# Patient Record
Sex: FEMALE | Race: WHITE | URBAN - METROPOLITAN AREA
[De-identification: names, ages, dates, MRNs, and addresses within clinical notes are randomized per-mention and may not be internally consistent; named-entity substitution may affect disease eponyms.]

---

## 2017-09-28 ENCOUNTER — TELEPHONE (OUTPATIENT)
Dept: OBGYN CLINIC | Facility: CLINIC | Age: 57
End: 2017-09-28

## 2017-11-21 ENCOUNTER — OFFICE VISIT (OUTPATIENT)
Dept: OBGYN CLINIC | Facility: CLINIC | Age: 57
End: 2017-11-21

## 2017-11-21 VITALS
WEIGHT: 122 LBS | DIASTOLIC BLOOD PRESSURE: 64 MMHG | HEIGHT: 66 IN | HEART RATE: 77 BPM | SYSTOLIC BLOOD PRESSURE: 118 MMHG | BODY MASS INDEX: 19.61 KG/M2

## 2017-11-21 DIAGNOSIS — N91.2: ICD-10-CM

## 2017-11-21 DIAGNOSIS — Z12.4 PAPANICOLAOU SMEAR FOR CERVICAL CANCER SCREENING: ICD-10-CM

## 2017-11-21 DIAGNOSIS — Z01.419 ENCOUNTER FOR GYNECOLOGICAL EXAMINATION WITHOUT ABNORMAL FINDING: Primary | ICD-10-CM

## 2017-11-21 DIAGNOSIS — Z12.31 SCREENING MAMMOGRAM, ENCOUNTER FOR: ICD-10-CM

## 2017-11-21 PROCEDURE — 99396 PREV VISIT EST AGE 40-64: CPT | Performed by: OBSTETRICS & GYNECOLOGY

## 2017-11-21 PROCEDURE — 88175 CYTOPATH C/V AUTO FLUID REDO: CPT | Performed by: OBSTETRICS & GYNECOLOGY

## 2017-11-21 PROCEDURE — 87624 HPV HI-RISK TYP POOLED RSLT: CPT | Performed by: OBSTETRICS & GYNECOLOGY

## 2017-11-21 RX ORDER — MEDROXYPROGESTERONE ACETATE 10 MG/1
10 TABLET ORAL DAILY
Qty: 7 TABLET | Refills: 0 | Status: SHIPPED | OUTPATIENT
Start: 2017-11-21 | End: 2017-12-05

## 2017-11-21 NOTE — PROGRESS NOTES
Patient ID:   Cholo Reynolds  is a 56year old female [id number]        HPI:     Here for general gyn exam. Last seen September 2016 by Dr Dragan Aquino. New medical/surgical hx:  None. Patient's last menstrual period was 06/01/2017 (within days). Menses:   Hx P POSSIBLE POLYPECTOMY 73666;  Surgeon: Sharri Betancourt MD;  Location: [address] [place] [address]  2009: JEANNETTE BIOPSY STEREOTACTIC [facility] 1 SITE RIGHT      Comment: benign  2012: NEEDLE BIOPSY LEFT      Comment: BENIGN  2012: NEED cancer screening      Orders Placed This Encounter      ThinPrep PAP with HPV Reflex Request    Meds & Refills for this Visit:  Signed Prescriptions Disp Refills    MedroxyPROGESTERone Acetate 10 MG Oral Tab 7 tablet 0      Sig: Take 1 tablet (10 mg total)

## 2017-11-22 ENCOUNTER — TELEPHONE (OUTPATIENT)
Dept: OBGYN CLINIC | Facility: CLINIC | Age: 57
End: 2017-11-22

## 2017-11-22 NOTE — TELEPHONE ENCOUNTER
Patient had questions regarding Provera. All questions answered. Will call if decides to not take it. Verbalized understanding. No further questions or concerns.

## 2017-11-25 ENCOUNTER — HOSPITAL ENCOUNTER (OUTPATIENT)
Dept: MAMMOGRAPHY | Age: 57
Discharge: HOME | End: 2017-11-25
Attending: OBSTETRICS & GYNECOLOGY
Payer: COMMERCIAL

## 2017-11-25 DIAGNOSIS — Z12.31 SCREENING MAMMOGRAM, ENCOUNTER FOR: ICD-10-CM

## 2017-11-25 PROCEDURE — 77067 SCR MAMMO BI INCL CAD: CPT | Performed by: OBSTETRICS & GYNECOLOGY

## 2017-12-05 ENCOUNTER — TELEPHONE (OUTPATIENT)
Dept: OBGYN CLINIC | Facility: CLINIC | Age: 57
End: 2017-12-05

## 2017-12-05 RX ORDER — MEDROXYPROGESTERONE ACETATE 10 MG/1
10 TABLET ORAL DAILY
Qty: 7 TABLET | Refills: 0 | Status: SHIPPED | OUTPATIENT
Start: 2017-12-05 | End: 2019-01-15 | Stop reason: ALTCHOICE

## 2017-12-05 NOTE — TELEPHONE ENCOUNTER
Patient stated that [place] did not receive Provera RX. Resent to pharmacy. Patient advised to call with any further questions or concerns.

## 2017-12-05 NOTE — TELEPHONE ENCOUNTER
Patient called, when she saw Dr. Millie Yoon in November, he was supposed to call in medication for her and her pharmacist told her that there was nothing sent from us. Pt wants to be notified.

## 2018-12-04 ENCOUNTER — TELEPHONE (OUTPATIENT)
Dept: OBGYN CLINIC | Facility: CLINIC | Age: 58
End: 2018-12-04

## 2018-12-04 DIAGNOSIS — Z12.39 BREAST CANCER SCREENING: Primary | ICD-10-CM

## 2018-12-04 NOTE — TELEPHONE ENCOUNTER
Patient's last annual was 11/2017. Per protocol she is able to have mammogram ordered before appt. Order entered. No further questions or concerns.

## 2018-12-04 NOTE — TELEPHONE ENCOUNTER
Patient made appointment for annual last week and wants an order for a mammogram so she can have this done prior to appointment.

## 2018-12-07 ENCOUNTER — HOSPITAL ENCOUNTER (OUTPATIENT)
Dept: MAMMOGRAPHY | Age: 58
Discharge: HOME | End: 2018-12-07
Attending: OBSTETRICS & GYNECOLOGY
Payer: COMMERCIAL

## 2018-12-07 DIAGNOSIS — Z12.39 BREAST CANCER SCREENING: ICD-10-CM

## 2018-12-07 PROCEDURE — 77067 SCR MAMMO BI INCL CAD: CPT | Performed by: OBSTETRICS & GYNECOLOGY

## 2018-12-07 PROCEDURE — 77063 BREAST TOMOSYNTHESIS BI: CPT | Performed by: OBSTETRICS & GYNECOLOGY

## 2019-01-15 ENCOUNTER — OFFICE VISIT (OUTPATIENT)
Dept: OBGYN CLINIC | Facility: CLINIC | Age: 59
End: 2019-01-15
Payer: COMMERCIAL

## 2019-01-15 VITALS
SYSTOLIC BLOOD PRESSURE: 118 MMHG | HEIGHT: 65.75 IN | BODY MASS INDEX: 19.52 KG/M2 | WEIGHT: 120 LBS | HEART RATE: 73 BPM | DIASTOLIC BLOOD PRESSURE: 60 MMHG

## 2019-01-15 DIAGNOSIS — Z01.419 ENCOUNTER FOR GYNECOLOGICAL EXAMINATION WITHOUT ABNORMAL FINDING: Primary | ICD-10-CM

## 2019-01-15 PROCEDURE — 99396 PREV VISIT EST AGE 40-64: CPT | Performed by: OBSTETRICS & GYNECOLOGY

## 2019-01-15 NOTE — PROGRESS NOTES
Patient ID:   Latesha Esparza  is a 66year old female [id number]        HPI:     Here for gyn exam. Last seen November 2017. New medical/surgical hx:  Some sporadic palpations - no cause found and decreased in frequency.       Patient's last menstrual period Ht 65.75\"   Wt 120 lb   LMP 01/07/2018 (Exact Date)   BMI 19.52 kg/m²   Neck:  Supple. Thyroid:  normal.  No cervical adenopathy. Cardiovascular: Normal rate, rhythm, heart sounds. Pulmonary/Chest: Clear to auscultation.   Breasts:   Symmetrical.  Firm

## 2019-11-04 ENCOUNTER — TELEPHONE (OUTPATIENT)
Dept: OBGYN CLINIC | Facility: CLINIC | Age: 59
End: 2019-11-04

## 2019-11-04 DIAGNOSIS — Z12.31 BREAST CANCER SCREENING BY MAMMOGRAM: Primary | ICD-10-CM

## 2019-11-04 NOTE — TELEPHONE ENCOUNTER
Last OV: 1/15/19 with Dr. Ravi Mckeon for annual  Last mammogram: 12/7/18 screening mammo birads 1- negative  Next appt: 1/15/20    screening mammogram ordered per protocol. Patient notified via phone.

## 2019-11-04 NOTE — TELEPHONE ENCOUNTER
Patient would like to get an order for a mammogram.  She will be seeing Dr. Kingsley Bustamante on 1/16/20.

## 2019-12-11 ENCOUNTER — HOSPITAL ENCOUNTER (OUTPATIENT)
Dept: MAMMOGRAPHY | Age: 59
Discharge: HOME | End: 2019-12-11
Attending: OBSTETRICS & GYNECOLOGY
Payer: COMMERCIAL

## 2019-12-11 DIAGNOSIS — Z12.31 BREAST CANCER SCREENING BY MAMMOGRAM: ICD-10-CM

## 2019-12-11 PROCEDURE — 77067 SCR MAMMO BI INCL CAD: CPT | Performed by: OBSTETRICS & GYNECOLOGY

## 2020-01-29 ENCOUNTER — OFFICE VISIT (OUTPATIENT)
Dept: OBGYN CLINIC | Facility: CLINIC | Age: 60
End: 2020-01-29
Payer: COMMERCIAL

## 2020-01-29 VITALS
HEIGHT: 66 IN | DIASTOLIC BLOOD PRESSURE: 64 MMHG | BODY MASS INDEX: 19.29 KG/M2 | HEART RATE: 71 BPM | WEIGHT: 120 LBS | SYSTOLIC BLOOD PRESSURE: 108 MMHG

## 2020-01-29 DIAGNOSIS — Z12.4 CERVICAL CANCER SCREENING: ICD-10-CM

## 2020-01-29 DIAGNOSIS — Z01.419 WELL FEMALE EXAM WITH ROUTINE GYNECOLOGICAL EXAM: Primary | ICD-10-CM

## 2020-01-29 DIAGNOSIS — Z14.8 GENETIC CARRIER STATUS: ICD-10-CM

## 2020-01-29 PROCEDURE — 88175 CYTOPATH C/V AUTO FLUID REDO: CPT | Performed by: OBSTETRICS & GYNECOLOGY

## 2020-01-29 PROCEDURE — 99396 PREV VISIT EST AGE 40-64: CPT | Performed by: OBSTETRICS & GYNECOLOGY

## 2020-01-29 PROCEDURE — 87624 HPV HI-RISK TYP POOLED RSLT: CPT | Performed by: OBSTETRICS & GYNECOLOGY

## 2020-01-29 NOTE — PROGRESS NOTES
GYN H&P     2020  10:58 AM    CC: Patient is here for annual    HPI: patient is a 64year old  here for her annual gyn exam.   She has no complaints. menopausal, does note some menop s/s with hot flashes and vaginal dryness.  She is not sure she Nevus - unusual features   • US BREAST BIOPSY 2 SITE BILAT (CPT=19083/93453)  2012    both benign     No Known Allergies  Multiple Vitamins-Minerals (MULTIVITAMIN OR), Take  by mouth., Disp: , Rfl:     No current facility-administered medications on file p No        Caffeine Concern: Yes          1 cup coffee per day        Occupational Exposure: No        Hobby Hazards: No        Sleep Concern: No        Stress Concern: No        Weight Concern: No        Special Diet: No        Back Care: No        Exercis axillary adenopathy  ABDOMEN: Soft, non distended; non tender, no masses  GYNE/: External Genitalia: Normal appearing, no lesions. Urethral meatus appear wnl, no abnormal discharge or lesions noted.            Bladder: well supported, urethra wnl, no lesi

## 2020-01-30 LAB — HPV I/H RISK 1 DNA SPEC QL NAA+PROBE: NEGATIVE

## 2020-03-10 ENCOUNTER — GENETICS ENCOUNTER (OUTPATIENT)
Dept: GENETICS | Facility: HOSPITAL | Age: 60
End: 2020-03-10
Attending: GENETIC COUNSELOR, MS
Payer: COMMERCIAL

## 2020-03-10 PROCEDURE — 96040 HC GENETIC COUNSELING EA 30 MIN: CPT | Performed by: GENETIC COUNSELOR, MS

## 2020-03-11 NOTE — PROGRESS NOTES
Referring Provider: Craig Ba MD    Additional Provider: Vijay Ruth MD    Reason for Referral:  Vale Curry was referred for genetic counseling because of a family history of a CHEK2 pathogenic variant.   Ms. Claudio Jackson is a 64year-old woman of [facility] Ms. Roz Benoit provided me with a copy of her sister’s genetic test report. Please see the pedigree for additional family history information. Counseling: The following information was discussed with Ms. Goetz. Genetics of Cancer:   The majority of can breast and colorectal cancer. Current NCCN Guidelines (v1.2020) do not include breast cancer screening recommendations for women with missense CHEK2 pathogenic variants.   They note, “the risk for most missense pathogenic variants are unclear but for s If Ms. Randall Gutierrez tests positive for the familial CHEK2 variant, she is at moderately increased risk for breast and colorectal cancer. This knowledge would influence her medical recommendations and choices regarding surveillance, screening, and prevention.

## 2020-03-24 ENCOUNTER — GENETICS ENCOUNTER (OUTPATIENT)
Dept: HEMATOLOGY/ONCOLOGY | Facility: HOSPITAL | Age: 60
End: 2020-03-24

## 2020-03-24 NOTE — PROGRESS NOTES
Referring Provider:       Angelica Newton MD     Additional Provider:     Frankie Jacinto MD    Reason for Referral:  Radha Banks had blood drawn on 3/10/2020 for genetic testing because of a family history of a CHEK2 variant in her mother.        Genetic Lisa extremely dense breast tissue. She should follow-up with Dr. Zack Tang or Dr. Yun Mora to discuss the pros and cons of bilateral screening breast MRI given her CHEK2 status and dense breast tissue.   I also encouraged her to call me annually about updates reg

## 2020-06-18 ENCOUNTER — TELEPHONE (OUTPATIENT)
Dept: GENETICS | Facility: HOSPITAL | Age: 60
End: 2020-06-18

## 2020-06-18 NOTE — TELEPHONE ENCOUNTER
Elverconcha Cole says her children are able to test for free, and she wanted to schedule them before the 90 days. She says she would like to speak with Nenita about this.  Please call

## 2020-09-28 ENCOUNTER — TELEPHONE (OUTPATIENT)
Dept: OBGYN CLINIC | Facility: CLINIC | Age: 60
End: 2020-09-28

## 2020-09-28 NOTE — TELEPHONE ENCOUNTER
Pt states she is in menopause and lately has been experiencing bloating, discharge and breast tenderness. She wanted to speak with a nurse to determine if this was normal or should she be checked out.

## 2020-09-28 NOTE — TELEPHONE ENCOUNTER
65 y/o called c/o worsening of menopausal symptoms. She has been post menopausal for 2 years. She reports new onset of breast tenderness, bloating, and clear discharge the past 2 weeks. Denies any vaginal bleeding.    Last OV date: 01/29/2020  Recent Test/L

## 2020-10-06 ENCOUNTER — OFFICE VISIT (OUTPATIENT)
Dept: OBGYN CLINIC | Facility: CLINIC | Age: 60
End: 2020-10-06
Payer: COMMERCIAL

## 2020-10-06 VITALS
BODY MASS INDEX: 19.13 KG/M2 | WEIGHT: 119 LBS | HEART RATE: 72 BPM | SYSTOLIC BLOOD PRESSURE: 104 MMHG | HEIGHT: 66 IN | DIASTOLIC BLOOD PRESSURE: 62 MMHG

## 2020-10-06 DIAGNOSIS — N95.1 MENOPAUSAL SYMPTOMS: Primary | ICD-10-CM

## 2020-10-06 PROCEDURE — 3008F BODY MASS INDEX DOCD: CPT | Performed by: OBSTETRICS & GYNECOLOGY

## 2020-10-06 PROCEDURE — 99213 OFFICE O/P EST LOW 20 MIN: CPT | Performed by: OBSTETRICS & GYNECOLOGY

## 2020-10-06 PROCEDURE — 3078F DIAST BP <80 MM HG: CPT | Performed by: OBSTETRICS & GYNECOLOGY

## 2020-10-06 PROCEDURE — 3074F SYST BP LT 130 MM HG: CPT | Performed by: OBSTETRICS & GYNECOLOGY

## 2020-10-06 NOTE — PROGRESS NOTES
Patient with history of menopause over last two years  recent  'bloatiness' last three weeks  Breast tenderness  No bleeding, vaginal dryness improved  Some abdominal pain like 'ovulation'    Has 21 months old grand child    ROS: No Cardiac, Respiratory, G

## 2020-12-07 ENCOUNTER — TELEPHONE (OUTPATIENT)
Dept: OBGYN CLINIC | Facility: CLINIC | Age: 60
End: 2020-12-07

## 2020-12-07 DIAGNOSIS — Z15.01 GENETIC SUSCEPTIBILITY TO MALIGNANT NEOPLASM OF BREAST: Primary | ICD-10-CM

## 2020-12-07 NOTE — TELEPHONE ENCOUNTER
Patient saw Gila Swartz March 2020 and was positive for chek2 variant. This puts patient at greater risk for breast and colorectal cancer. She was advised on screening but could not remember if MRI and mammogram needed to be done together or alternating.

## 2020-12-09 NOTE — TELEPHONE ENCOUNTER
Have not received call back from nurse navigator as of now. Spoke with Dr. Opal Hobbs. She stated patient should have Mammogram ordered at this time and then follow up with oncology with [facility] for further recommendations on alternating mammogram and MRI.

## 2020-12-22 ENCOUNTER — HOSPITAL ENCOUNTER (OUTPATIENT)
Dept: MAMMOGRAPHY | Age: 60
Discharge: HOME | End: 2020-12-22
Attending: OBSTETRICS & GYNECOLOGY
Payer: COMMERCIAL

## 2020-12-22 DIAGNOSIS — Z15.01 GENETIC SUSCEPTIBILITY TO MALIGNANT NEOPLASM OF BREAST: ICD-10-CM

## 2020-12-22 PROCEDURE — 77067 SCR MAMMO BI INCL CAD: CPT | Performed by: OBSTETRICS & GYNECOLOGY

## 2020-12-22 PROCEDURE — 77063 BREAST TOMOSYNTHESIS BI: CPT | Performed by: OBSTETRICS & GYNECOLOGY

## 2021-02-26 PROBLEM — Z00.00 HEALTHCARE MAINTENANCE: Status: ACTIVE | Noted: 2021-02-26

## 2021-02-26 PROBLEM — Z15.89 BIALLELIC MUTATION OF CHEK2 GENE: Status: ACTIVE | Noted: 2021-02-26

## 2021-02-26 PROBLEM — Z15.01 BIALLELIC MUTATION OF CHEK2 GENE: Status: ACTIVE | Noted: 2021-02-26

## 2021-02-26 PROBLEM — Z15.09 BIALLELIC MUTATION OF CHEK2 GENE: Status: ACTIVE | Noted: 2021-02-26

## 2021-02-26 PROBLEM — E78.00 PURE HYPERCHOLESTEROLEMIA: Status: ACTIVE | Noted: 2021-02-26

## 2021-02-26 PROBLEM — Z80.0 FAMILY HISTORY OF COLON CANCER IN MOTHER: Status: ACTIVE | Noted: 2021-02-26

## 2021-02-26 PROBLEM — R91.8 LUNG NODULES: Status: ACTIVE | Noted: 2021-02-26

## 2021-02-26 PROBLEM — R06.83 SNORING: Status: ACTIVE | Noted: 2021-02-26

## 2021-02-26 PROBLEM — Z80.3 FAMILY HISTORY OF BREAST CANCER IN MOTHER: Status: ACTIVE | Noted: 2021-02-26

## 2021-03-19 ENCOUNTER — OFFICE VISIT (OUTPATIENT)
Dept: OBGYN CLINIC | Facility: CLINIC | Age: 61
End: 2021-03-19
Payer: COMMERCIAL

## 2021-03-19 VITALS
WEIGHT: 119.63 LBS | DIASTOLIC BLOOD PRESSURE: 70 MMHG | HEART RATE: 77 BPM | HEIGHT: 66 IN | SYSTOLIC BLOOD PRESSURE: 108 MMHG | BODY MASS INDEX: 19.22 KG/M2

## 2021-03-19 DIAGNOSIS — N95.1 MENOPAUSAL SYMPTOMS: ICD-10-CM

## 2021-03-19 DIAGNOSIS — Z01.419 WELL FEMALE EXAM WITH ROUTINE GYNECOLOGICAL EXAM: Primary | ICD-10-CM

## 2021-03-19 PROCEDURE — 99396 PREV VISIT EST AGE 40-64: CPT | Performed by: OBSTETRICS & GYNECOLOGY

## 2021-03-19 PROCEDURE — 3078F DIAST BP <80 MM HG: CPT | Performed by: OBSTETRICS & GYNECOLOGY

## 2021-03-19 PROCEDURE — 3008F BODY MASS INDEX DOCD: CPT | Performed by: OBSTETRICS & GYNECOLOGY

## 2021-03-19 PROCEDURE — 3074F SYST BP LT 130 MM HG: CPT | Performed by: OBSTETRICS & GYNECOLOGY

## 2021-03-19 NOTE — PROGRESS NOTES
Annual  No C/O  Menopausal state, palpitations (saw pulmonologist)  Vaginal dryness, Revaree discussed again  Falls asleep, then wakes up  Going for colonoscopy, mammogram (CHEK2 carrier)    ROS: No Cardiac, Respiratory, GI,  or Neurological symptoms.

## 2021-04-14 PROBLEM — H93.299 ABNORMAL AUDITORY PERCEPTION, UNSPECIFIED LATERALITY: Status: ACTIVE | Noted: 2021-04-14

## 2021-04-14 PROBLEM — H93.12 LEFT-SIDED TINNITUS: Status: ACTIVE | Noted: 2021-04-14

## 2021-04-14 PROBLEM — G47.33 OSA (OBSTRUCTIVE SLEEP APNEA): Status: ACTIVE | Noted: 2021-04-14

## 2021-05-26 PROBLEM — Z91.89 AT HIGH RISK FOR BREAST CANCER: Status: ACTIVE | Noted: 2021-05-26

## 2023-02-07 ENCOUNTER — OFFICE VISIT (OUTPATIENT)
Dept: OBGYN CLINIC | Facility: CLINIC | Age: 63
End: 2023-02-07
Payer: COMMERCIAL

## 2023-02-07 VITALS
HEIGHT: 66 IN | BODY MASS INDEX: 19.35 KG/M2 | WEIGHT: 120.38 LBS | HEART RATE: 86 BPM | DIASTOLIC BLOOD PRESSURE: 54 MMHG | SYSTOLIC BLOOD PRESSURE: 100 MMHG

## 2023-02-07 DIAGNOSIS — Z15.01 BIALLELIC MUTATION OF CHEK2 GENE WITHOUT DIAGNOSED MALIGNANCY: ICD-10-CM

## 2023-02-07 DIAGNOSIS — Z01.419 WELL WOMAN EXAM WITH ROUTINE GYNECOLOGICAL EXAM: Primary | ICD-10-CM

## 2023-02-07 DIAGNOSIS — Z15.09 BIALLELIC MUTATION OF CHEK2 GENE WITHOUT DIAGNOSED MALIGNANCY: ICD-10-CM

## 2023-02-07 DIAGNOSIS — Z12.4 SCREENING FOR CERVICAL CANCER: ICD-10-CM

## 2023-02-07 DIAGNOSIS — Z12.31 ENCOUNTER FOR SCREENING MAMMOGRAM FOR MALIGNANT NEOPLASM OF BREAST: ICD-10-CM

## 2023-02-07 DIAGNOSIS — Z11.51 SCREENING FOR HUMAN PAPILLOMAVIRUS (HPV): ICD-10-CM

## 2023-02-07 PROCEDURE — 3074F SYST BP LT 130 MM HG: CPT | Performed by: NURSE PRACTITIONER

## 2023-02-07 PROCEDURE — 87624 HPV HI-RISK TYP POOLED RSLT: CPT | Performed by: NURSE PRACTITIONER

## 2023-02-07 PROCEDURE — 3008F BODY MASS INDEX DOCD: CPT | Performed by: NURSE PRACTITIONER

## 2023-02-07 PROCEDURE — 99386 PREV VISIT NEW AGE 40-64: CPT | Performed by: NURSE PRACTITIONER

## 2023-02-07 PROCEDURE — 3078F DIAST BP <80 MM HG: CPT | Performed by: NURSE PRACTITIONER

## 2023-02-08 LAB — HPV I/H RISK 1 DNA SPEC QL NAA+PROBE: NEGATIVE

## 2023-02-10 ENCOUNTER — HOSPITAL ENCOUNTER (OUTPATIENT)
Dept: MAMMOGRAPHY | Age: 63
Discharge: HOME | End: 2023-02-10
Attending: NURSE PRACTITIONER
Payer: COMMERCIAL

## 2023-02-10 DIAGNOSIS — Z12.31 ENCOUNTER FOR SCREENING MAMMOGRAM FOR MALIGNANT NEOPLASM OF BREAST: ICD-10-CM

## 2023-02-10 PROCEDURE — 77063 BREAST TOMOSYNTHESIS BI: CPT | Performed by: NURSE PRACTITIONER

## 2023-02-10 PROCEDURE — 77067 SCR MAMMO BI INCL CAD: CPT | Performed by: NURSE PRACTITIONER

## 2023-02-21 LAB
LAST PAP RESULT: NORMAL
PAP HISTORY (OTHER THAN LAST PAP): NORMAL

## 2023-09-22 ENCOUNTER — TELEPHONE (OUTPATIENT)
Dept: OBGYN CLINIC | Facility: CLINIC | Age: 63
End: 2023-09-22

## 2023-09-22 NOTE — TELEPHONE ENCOUNTER
Pt called stating she needs PA for her breast MRI. Her appt is in about 1 1/2 weeks.      Future Appointments   Date Time Provider Karis Paez   10/2/2023  6:00 PM [address] MR RM2 (1.5T WIDE) [address] MRI [facility]     Pt would like to know when this is approved as she had a very hard time in 2021 for this to be covered

## 2023-09-22 NOTE — TELEPHONE ENCOUNTER
Patient notified that the hospital completes the prior authorization process for her breast MRI. Patient states she has already contacted her insurance and Central Scheduling at [facility] and was not able to obtain confirmation that her imaging has been authorized. Patient states that she went through a year long appeal process with her last breast MRI bill and she does not want to do that again. Informed patient that I called the Radiology/Mammography department and Central Scheduling and was not able to verify if authorization has been obtained. Email sent to the insurance verification team at [facility] to check on status of authorization. Will wait for response. Encouraged patient to contact [facility] herself and ask to be transferred to someone who can help with an imaging authorization. Patient verbalized understanding.

## 2023-09-28 NOTE — TELEPHONE ENCOUNTER
Per referral notes:  Note:  Called: Wally Singer [phone]  Spoke with:   Status: PENDING  Call reference #  ON HOLD WITH HEALTH PLAN, UNABLE TO REACH LIVE REP. CASE PENDING WITH HEALTH PLAN.

## 2023-10-02 ENCOUNTER — HOSPITAL ENCOUNTER (OUTPATIENT)
Dept: MRI IMAGING | Facility: HOSPITAL | Age: 63
Discharge: HOME | End: 2023-10-02
Attending: NURSE PRACTITIONER
Payer: COMMERCIAL

## 2023-10-02 DIAGNOSIS — Z15.01 BIALLELIC MUTATION OF CHEK2 GENE WITHOUT DIAGNOSED MALIGNANCY: ICD-10-CM

## 2023-10-02 DIAGNOSIS — Z15.09 BIALLELIC MUTATION OF CHEK2 GENE WITHOUT DIAGNOSED MALIGNANCY: ICD-10-CM

## 2023-10-02 PROCEDURE — 77049 MRI BREAST C-+ W/CAD BI: CPT | Performed by: NURSE PRACTITIONER

## 2023-10-02 PROCEDURE — A9575 INJ GADOTERATE MEGLUMI 0.1ML: HCPCS

## 2023-10-02 RX ORDER — GADOTERATE MEGLUMINE 376.9 MG/ML
15 INJECTION INTRAVENOUS
Status: COMPLETED | OUTPATIENT
Start: 2023-10-02 | End: 2023-10-02

## 2023-10-02 RX ADMIN — GADOTERATE MEGLUMINE 11 ML: 376.9 INJECTION INTRAVENOUS at 20:15:00

## 2024-03-29 ENCOUNTER — OFFICE VISIT (OUTPATIENT)
Dept: OBGYN CLINIC | Facility: CLINIC | Age: 64
End: 2024-03-29
Payer: COMMERCIAL

## 2024-03-29 VITALS
WEIGHT: 121.13 LBS | BODY MASS INDEX: 20 KG/M2 | SYSTOLIC BLOOD PRESSURE: 102 MMHG | HEART RATE: 79 BPM | DIASTOLIC BLOOD PRESSURE: 60 MMHG

## 2024-03-29 DIAGNOSIS — Z15.89 BIALLELIC MUTATION OF CHEK2 GENE: ICD-10-CM

## 2024-03-29 DIAGNOSIS — Z80.3 FAMILY HISTORY OF BREAST CANCER IN MOTHER: ICD-10-CM

## 2024-03-29 DIAGNOSIS — Z15.01 BIALLELIC MUTATION OF CHEK2 GENE: ICD-10-CM

## 2024-03-29 DIAGNOSIS — Z12.31 ENCOUNTER FOR SCREENING MAMMOGRAM FOR MALIGNANT NEOPLASM OF BREAST: ICD-10-CM

## 2024-03-29 DIAGNOSIS — Z15.09 BIALLELIC MUTATION OF CHEK2 GENE: ICD-10-CM

## 2024-03-29 DIAGNOSIS — Z01.419 WELL WOMAN EXAM WITH ROUTINE GYNECOLOGICAL EXAM: Primary | ICD-10-CM

## 2024-03-29 DIAGNOSIS — Z91.89 AT HIGH RISK FOR BREAST CANCER: ICD-10-CM

## 2024-03-29 PROCEDURE — 99396 PREV VISIT EST AGE 40-64: CPT | Performed by: NURSE PRACTITIONER

## 2024-03-29 NOTE — PROGRESS NOTES
Subjective:  Chief Complaint   Patient presents with    Physical     Annual exam      63 year old female  presents for annual.    Pt notes she occasionally gets headaches after intercourse  Not everytime  Not sure of aggravating factors  Pain self resolves    Patient's last menstrual period was 2018 (exact date).  Hx Prior Abnormal Pap: No  Pap Date: 23  Pap Result Notes: WNL  Menarche: 15 (3/29/2024  7:50 AM)  Period Cycle (Days): Postmeno (3/29/2024  7:50 AM)  Period Duration (Days): N/A (3/29/2024  7:50 AM)  Period Flow: N/A (3/29/2024  7:50 AM)  Use of Birth Control (if yes, specify type): Postmenopausal (3/29/2024  7:50 AM)  Hx Prior Abnormal Pap: No (3/29/2024  7:50 AM)  Pap Date: 23 (3/29/2024  7:50 AM)  Pap Result Notes: WNL (3/29/2024  7:50 AM)    Last Mammo:  2023    Pt is chek2 positive  Was doing MRI and mammogram staggered by 6months    Feeling safe at home.    Most Recent Immunizations   Administered Date(s) Administered    Covid-19 Vaccine Moderna 100 mcg/0.5 ml 01/15/2022    Depo-Medrol 40mg Inj 2016      reports that she has never smoked. She has never used smokeless tobacco.   reports current alcohol use.    Past Medical History:   Diagnosis Date    Abnormal uterine bleeding     Cancer (HCC) 6/15/04    Melanoma in situ on back    H/O mammogram 10/03/2016    benign    Pap smear for cervical cancer screening , , , 12/10, ,     WNL    Scapular dyskinesis 2016    Usual ductal hyperplasia of right breast 2009    on R Br Bx - in media     Past Surgical History:   Procedure Laterality Date    ANESTH,KNEE VEINS SURGERY      BREAST BIOPSY Bilateral 10/11/2012    benign    BREAST BIOPSY Right 2009    Usual Ductal Hyperplasia    COLONOSCOPY  12  ASC (Pan)    Screening: int hemorrhoids; next colonoscopy in 10 yrs    COLONOSCOPY,DIAGNOSTIC  2012    Procedure: COLONOSCOPY, POSSIBLE BIOPSY, POSSIBLE POLYPECTOMY 85355;   Surgeon: Amador Pan MD;  Location: [facility] [place]    NEEDLE BIOPSY LEFT  2012     apocrine cyst     NEEDLE BIOPSY RIGHT  2012    -Proliferative fibrocystic changes including somewhat circumscribed     SKIN SURGERY  1/13/14    Bashir's Nevus - unusual features       Review of Systems:  Pertinent items are noted in the HPI.    Objective:  /60   Pulse 79   Wt 121 lb 1.6 oz (54.9 kg)   LMP 01/07/2018 (Exact Date)   BMI 19.55 kg/m²    Physical Examination:  General appearance: Well dressed, well nourished in no apparent distress  Neurologic/Psychiatric: Alert and oriented to person, place and time, mood normal, affect appropriate  Head: Normocephalic without obvious deformity, atraumatic  Neck: No thyromegaly, supple, non-tender, no masses, no adenopathy  Lungs: Clear to auscultation bilaterally, no rales, wheezes or rhonchi  Breasts: Symmetric, non-tender, no masses, lesions, retraction, dimpling or discharge bilaterally, no axillary or supraclavicular lymphadenopathy  Heart: Regular rate and rhythm, no gallops or murmurs  Abdomen: Soft, non-tender, non-distended, no masses, no hepatosplenomegaly, no hernias, no inguinal lymphadenopathy  Pelvic:    External genitalia- Normal, Bartholin's, urethra, skeins glands normal   Vagina- No vaginal lesions, physiologic discharge   Urethra- Non-tender, no masses   Urethral Meatus- No lesions or masses, no prolapse   Bladder- Non-tender, no masses   Cervix- No lesions, long/closed, no cervical motion tenderness   Uterus- Normal sized, non-tender, no masses   Adnexa-  Non-tender, no masses   Anus/Perineum- Normal, no masses or lesions  Extremities: Non-tender, full range of motion, no clubbing, cyanosis or edema  Skin:  General inspection- no rashes, lesions or discoloration    Assessment/Plan:  Normal well-woman exam.  Yearly mammogram ordered  Pap smear UTD      Diagnoses and all orders for this visit:    Well woman exam with routine gynecological exam  -  cervical CA screen last 2/2023, ASCCP pap guidelines discussed, pap deferred    Encounter for screening mammogram for malignant neoplasm of breast  -     JEANNETTE SUZAN 2D+3D SCREENING BILAT (CPT=77067/34970); Future    Family history of breast cancer in mother  -     MRI BREAST SCREENING (W+WO) W/CAD BILAT (CPT=77049); Future  -     JEANNETTE SUZAN 2D+3D SCREENING BILAT (CPT=77067/72128); Future    Biallelic mutation of CHEK2 gene  -     MRI BREAST SCREENING (W+WO) W/CAD BILAT (CPT=77049); Future  -     JEANNETTE SUZAN 2D+3D SCREENING BILAT (CPT=77067/93426); Future  - colonoscopy per GI    At high risk for breast cancer  -     MRI BREAST SCREENING (W+WO) W/CAD BILAT (CPT=77049); Future  -     JEANNETTE SUZAN 2D+3D SCREENING BILAT (CPT=77067/63901); Future  - monthly SBE  - biannual CBE         Return in about 1 year (around 3/29/2025) for annual well woman exam or sooner if needed.

## 2024-04-17 ENCOUNTER — HOSPITAL ENCOUNTER (OUTPATIENT)
Dept: MAMMOGRAPHY | Age: 64
Discharge: HOME | End: 2024-04-17
Attending: NURSE PRACTITIONER
Payer: COMMERCIAL

## 2024-04-17 DIAGNOSIS — Z80.3 FAMILY HISTORY OF BREAST CANCER IN MOTHER: ICD-10-CM

## 2024-04-17 DIAGNOSIS — Z12.31 ENCOUNTER FOR SCREENING MAMMOGRAM FOR MALIGNANT NEOPLASM OF BREAST: ICD-10-CM

## 2024-04-17 DIAGNOSIS — Z15.01 BIALLELIC MUTATION OF CHEK2 GENE: ICD-10-CM

## 2024-04-17 DIAGNOSIS — Z15.09 BIALLELIC MUTATION OF CHEK2 GENE: ICD-10-CM

## 2024-04-17 DIAGNOSIS — Z15.89 BIALLELIC MUTATION OF CHEK2 GENE: ICD-10-CM

## 2024-04-17 DIAGNOSIS — Z91.89 AT HIGH RISK FOR BREAST CANCER: ICD-10-CM

## 2024-04-17 PROCEDURE — 77067 SCR MAMMO BI INCL CAD: CPT | Performed by: NURSE PRACTITIONER

## 2024-04-17 PROCEDURE — 77063 BREAST TOMOSYNTHESIS BI: CPT | Performed by: NURSE PRACTITIONER

## 2025-03-06 ENCOUNTER — PATIENT MESSAGE (OUTPATIENT)
Dept: OBGYN CLINIC | Facility: CLINIC | Age: 65
End: 2025-03-06

## 2025-03-07 NOTE — TELEPHONE ENCOUNTER
Patient due for mammogram in April. Did not obtain 6mo MRI as recommended. Asks if she should have MRI or Mammogram first.  LOV 3/29/24

## 2025-03-07 NOTE — TELEPHONE ENCOUNTER
Pt to complete breast MRI as previously ordered.  Will discuss mammogram at upcoming annual well woman exam

## 2025-03-11 ENCOUNTER — TELEPHONE (OUTPATIENT)
Dept: OBGYN CLINIC | Facility: CLINIC | Age: 65
End: 2025-03-11

## 2025-03-11 DIAGNOSIS — Z12.31 ENCOUNTER FOR SCREENING MAMMOGRAM FOR BREAST CANCER: Primary | ICD-10-CM

## 2025-03-11 NOTE — TELEPHONE ENCOUNTER
Pt calling to speak with nurse regarding scheduling issues with MRI and mammogram . More details are on pt message dates 03/06. Pt can be contacted on her cell

## 2025-03-12 NOTE — TELEPHONE ENCOUNTER
Unable to schedule breast MRI.  Per Radiology, last mammogram must be within 1 year (4/17/2024)  No openings available to schedule breast MRI before 4/17/2024.  Patient asking if she can schedule mammogram  -order pended, routed to provider for review

## 2025-04-02 ENCOUNTER — OFFICE VISIT (OUTPATIENT)
Dept: OBGYN CLINIC | Facility: CLINIC | Age: 65
End: 2025-04-02
Payer: COMMERCIAL

## 2025-04-02 VITALS
DIASTOLIC BLOOD PRESSURE: 54 MMHG | HEART RATE: 69 BPM | SYSTOLIC BLOOD PRESSURE: 100 MMHG | HEIGHT: 66 IN | WEIGHT: 123.38 LBS | BODY MASS INDEX: 19.83 KG/M2

## 2025-04-02 DIAGNOSIS — G47.00 DIFFICULTY STAYING ASLEEP: ICD-10-CM

## 2025-04-02 DIAGNOSIS — N95.2 VAGINAL ATROPHY: ICD-10-CM

## 2025-04-02 DIAGNOSIS — Z80.3 FAMILY HISTORY OF BREAST CANCER IN MOTHER: ICD-10-CM

## 2025-04-02 DIAGNOSIS — Z91.89 AT INCREASED RISK OF BREAST CANCER: ICD-10-CM

## 2025-04-02 DIAGNOSIS — Z15.89 BIALLELIC MUTATION OF CHEK2 GENE: ICD-10-CM

## 2025-04-02 DIAGNOSIS — Z01.419 WELL WOMAN EXAM WITH ROUTINE GYNECOLOGICAL EXAM: Primary | ICD-10-CM

## 2025-04-02 DIAGNOSIS — M62.81 DECREASED MUSCLE STRENGTH: ICD-10-CM

## 2025-04-02 DIAGNOSIS — Z15.09 BIALLELIC MUTATION OF CHEK2 GENE: ICD-10-CM

## 2025-04-02 DIAGNOSIS — Z15.01 BIALLELIC MUTATION OF CHEK2 GENE: ICD-10-CM

## 2025-04-02 PROCEDURE — 99213 OFFICE O/P EST LOW 20 MIN: CPT | Performed by: NURSE PRACTITIONER

## 2025-04-02 PROCEDURE — 99396 PREV VISIT EST AGE 40-64: CPT | Performed by: NURSE PRACTITIONER

## 2025-04-02 NOTE — PROGRESS NOTES
Subjective:  Chief Complaint   Patient presents with    Annual     64 year old female  presents for annual.    Vaginal dryness  Has tried some topical     Pt notes difficulty maintaining sleep  Has completed sleep study    Unable to maintain muscle   Working out  Feels sore every time after    Patient's last menstrual period was 2018 (exact date).  Hx Prior Abnormal Pap: No  Pap Date: 23  Pap Result Notes: WNL  Menarche: 15 (2025  8:53 AM)  Period Cycle (Days): Postmeno (2025  8:53 AM)  Period Duration (Days): N/A (2025  8:53 AM)  Period Flow: N/A (2025  8:53 AM)  Use of Birth Control (if yes, specify type): Postmenopausal (2025  8:53 AM)  Hx Prior Abnormal Pap: No (2025  8:53 AM)  Pap Date: 23 (2025  8:53 AM)  Pap Result Notes: WNL (2025  8:53 AM)      Last Colonoscopy: 10/2022 - repeat 5 years  Last Mammo:  2024- scheduled     Family history significant for colon CA  CHEK2 mutation +    Feeling safe at home.    Most Recent Immunizations   Administered Date(s) Administered    Covid-19 Vaccine Moderna 100 mcg/0.5 ml 01/15/2022    Depo-Medrol 40mg Inj 2016      reports that she has never smoked. She has never used smokeless tobacco.   reports current alcohol use of about 1.0 standard drink of alcohol per week.    Past Medical History:    Abnormal uterine bleeding    Cancer (HCC)    Melanoma in situ on back    H/O mammogram    benign    Pap smear for cervical cancer screening    WNL    Scapular dyskinesis    Usual ductal hyperplasia of right breast    on R Br Bx - in media     Past Surgical History:   Procedure Laterality Date    Anesth,knee veins surgery      Breast biopsy Bilateral 10/11/2012    benign    Breast biopsy Right 2009    Usual Ductal Hyperplasia    Colonoscopy  12  ASC (Pan)    Screening: int hemorrhoids; next colonoscopy in 10 yrs    Colonoscopy,diagnostic  2012    Procedure: COLONOSCOPY, POSSIBLE BIOPSY,  POSSIBLE POLYPECTOMY 01780;  Surgeon: Amador Pan MD;  Location: [facility]    Needle biopsy left  2012     apocrine cyst     Needle biopsy right  2012    -Proliferative fibrocystic changes including somewhat circumscribed     Skin surgery  1/13/14    Bashir's Nevus - unusual features       Review of Systems:  Pertinent items are noted in the HPI.    Objective:  /54   Pulse 69   Ht 66\"   Wt 123 lb 6 oz (56 kg)   LMP 01/07/2018 (Exact Date)   BMI 19.91 kg/m²    Physical Examination:  General appearance: Well dressed, well nourished in no apparent distress  Neurologic/Psychiatric: Alert and oriented to person, place and time, mood normal, affect appropriate  Head: Normocephalic without obvious deformity, atraumatic  Neck: No thyromegaly, supple, non-tender, no masses, no adenopathy  Lungs: Clear to auscultation bilaterally, no rales, wheezes or rhonchi  Breasts: Symmetric, non-tender, no masses, lesions, retraction, dimpling or discharge bilaterally, no axillary or supraclavicular lymphadenopathy  Heart: Regular rate and rhythm, no gallops or murmurs  Abdomen: Soft, non-tender, non-distended, no masses, no hepatosplenomegaly, no hernias, no inguinal lymphadenopathy  Pelvic:    External genitalia- Normal, Bartholin's, urethra, skeins glands normal   Vagina- + atrophic, No vaginal lesions, physiologic discharge   Urethra- Non-tender, no masses   Urethral Meatus- No lesions or masses, no prolapse   Bladder- Non-tender, no masses   Cervix- No lesions, long/closed, no cervical motion tenderness   Uterus- Normal sized, non-tender, no masses   Adnexa-  Non-tender, no masses   Anus/Perineum- Normal, no masses or lesions  Extremities: Non-tender, full range of motion, no clubbing, cyanosis or edema  Skin:  General inspection- no rashes, lesions or discoloration      Assessment/Plan:  Normal well-woman exam.  Yearly mammogram scheduled   Pap smear UTD.  Screening colonoscopy UTD    Declined  chaperone for exam today     Diagnoses and all orders for this visit:    Well woman exam with routine gynecological exam  - self breast exam discussed and encouraged    Biallelic mutation of CHEK2 gene  -     MRI BREAST SCREENING (W+WO) W/CAD BILAT (CPT=77049); Future    Family history of breast cancer in mother  -     MRI BREAST SCREENING (W+WO) W/CAD BILAT (CPT=77049); Future    At increased risk of breast cancer  -     MRI BREAST SCREENING (W+WO) W/CAD BILAT (CPT=77049); Future  - mammogram as scheduled  - monthly SBE  Biannual CBE    Decreased muscle strength  - discussed exercise  - increase dietary protein    Difficulty staying asleep  - negative sleep study  - discussed sleep hygiene  - can try melatonin and tart cherry juice    Vaginal atrophy  - replens and revaree  - water or silicone based lubricant with intercourse        Return in about 1 year (around 4/2/2026) for annual well woman exam or sooner if needed.

## 2025-04-18 ENCOUNTER — HOSPITAL ENCOUNTER (OUTPATIENT)
Dept: MAMMOGRAPHY | Age: 65
Discharge: HOME | End: 2025-04-18
Attending: NURSE PRACTITIONER
Payer: COMMERCIAL

## 2025-04-18 DIAGNOSIS — Z12.31 ENCOUNTER FOR SCREENING MAMMOGRAM FOR BREAST CANCER: ICD-10-CM

## 2025-04-18 PROCEDURE — 77063 BREAST TOMOSYNTHESIS BI: CPT | Performed by: NURSE PRACTITIONER

## 2025-04-18 PROCEDURE — 77067 SCR MAMMO BI INCL CAD: CPT | Performed by: NURSE PRACTITIONER
